# Patient Record
Sex: FEMALE | Race: WHITE | NOT HISPANIC OR LATINO | URBAN - NONMETROPOLITAN AREA
[De-identification: names, ages, dates, MRNs, and addresses within clinical notes are randomized per-mention and may not be internally consistent; named-entity substitution may affect disease eponyms.]

---

## 2020-06-12 ENCOUNTER — OFFICE VISIT (OUTPATIENT)
Dept: URBAN - NONMETROPOLITAN AREA CLINIC 2 | Facility: CLINIC | Age: 80
End: 2020-06-12

## 2020-06-17 ENCOUNTER — OFFICE VISIT (OUTPATIENT)
Dept: URBAN - NONMETROPOLITAN AREA CLINIC 1 | Facility: CLINIC | Age: 80
End: 2020-06-17
Payer: MEDICARE

## 2020-06-17 DIAGNOSIS — K74.69 OTHER CIRRHOSIS OF LIVER: ICD-10-CM

## 2020-06-17 DIAGNOSIS — R18.8 ASCITES: ICD-10-CM

## 2020-06-17 DIAGNOSIS — N28.1 CYST, KIDNEY, ACQUIRED: ICD-10-CM

## 2020-06-17 PROCEDURE — 76705 ECHO EXAM OF ABDOMEN: CPT | Performed by: INTERNAL MEDICINE

## 2020-08-20 ENCOUNTER — ERX REFILL RESPONSE (OUTPATIENT)
Age: 80
End: 2020-08-20

## 2020-08-20 RX ORDER — CARVEDILOL 3.12 MG/1
TAKE 1 TABLET BY MOUTH 2 TIME A DAY WITH FOOD TABLET, FILM COATED ORAL
Qty: 60 | Refills: 4

## 2020-08-21 ENCOUNTER — OFFICE VISIT (OUTPATIENT)
Dept: URBAN - NONMETROPOLITAN AREA CLINIC 2 | Facility: CLINIC | Age: 80
End: 2020-08-21
Payer: MEDICARE

## 2020-08-21 ENCOUNTER — LAB OUTSIDE AN ENCOUNTER (OUTPATIENT)
Dept: URBAN - NONMETROPOLITAN AREA CLINIC 2 | Facility: CLINIC | Age: 80
End: 2020-08-21

## 2020-08-21 DIAGNOSIS — K74.60 CIRRHOSIS: ICD-10-CM

## 2020-08-21 DIAGNOSIS — K72.90 HEPATIC ENCEPHALOPATHY: ICD-10-CM

## 2020-08-21 DIAGNOSIS — R18.8 ASCITES: ICD-10-CM

## 2020-08-21 DIAGNOSIS — K76.6 PORTAL HYPERTENSION: ICD-10-CM

## 2020-08-21 PROCEDURE — G8420 CALC BMI NORM PARAMETERS: HCPCS | Performed by: INTERNAL MEDICINE

## 2020-08-21 PROCEDURE — 99214 OFFICE O/P EST MOD 30 MIN: CPT | Performed by: INTERNAL MEDICINE

## 2020-08-21 PROCEDURE — G8427 DOCREV CUR MEDS BY ELIG CLIN: HCPCS | Performed by: INTERNAL MEDICINE

## 2020-08-21 PROCEDURE — G9903 PT SCRN TBCO ID AS NON USER: HCPCS | Performed by: INTERNAL MEDICINE

## 2020-08-21 RX ORDER — FOLIC ACID 1 MG/1
TAKE 1 TABLET (1 MG) BY ORAL ROUTE ONCE DAILY TABLET ORAL 1
Qty: 0 | Refills: 0 | Status: ACTIVE | COMMUNITY
Start: 1900-01-01

## 2020-08-21 RX ORDER — LEVOTHYROXINE SODIUM 0.1 MG/1
TAKE 1 TABLET (100 MCG) BY ORAL ROUTE ONCE DAILY TABLET ORAL 1
Qty: 0 | Refills: 0 | Status: ACTIVE | COMMUNITY
Start: 1900-01-01

## 2020-08-21 RX ORDER — FUROSEMIDE 40 MG/1
TAKE 1 AND A HALF  TABLETS  (60 MG) BY ORAL ROUTE ONCE DAILY FOR 30 DAYS TABLET ORAL 1
Qty: 90 | Refills: 5 | Status: ACTIVE | COMMUNITY
Start: 2020-02-28 | End: 2021-02-22

## 2020-08-21 RX ORDER — SPIRONOLACTONE 50 MG/1
TAKE 3 TABLETS BY ORAL ROUTE DAILY FOR 60 DAYS TABLET, FILM COATED ORAL 1
Qty: 180 | Refills: 3 | Status: ACTIVE | COMMUNITY
Start: 2020-02-28 | End: 2020-10-25

## 2020-08-21 RX ORDER — POTASSIUM CHLORIDE 1.5 G/1
POWDER, FOR SOLUTION ORAL
Qty: 0 | Refills: 0 | Status: ACTIVE | COMMUNITY
Start: 1900-01-01

## 2020-08-21 RX ORDER — CARVEDILOL 3.12 MG/1
TAKE 1 TABLET BY MOUTH 2 TIME A DAY WITH FOOD TABLET, FILM COATED ORAL
Qty: 60 | Refills: 4 | Status: ACTIVE | COMMUNITY

## 2020-08-21 RX ORDER — LACTULOSE 10 G/15ML
TAKE 30 MILLILITERS (20 GRAM) BY ORAL ROUTE ONCE DAILY FOR 30 DAYS SOLUTION ORAL 1
Qty: 900 | Refills: 5 | Status: ACTIVE | COMMUNITY
Start: 2020-02-28 | End: 2020-08-26

## 2020-08-21 RX ORDER — RIFAXIMIN 550 MG/1
TAKE 1 TABLET (550 MG) BY ORAL ROUTE 2 TIMES PER DAY FOR 30 DAYS TABLET ORAL 2
Qty: 60 | Refills: 5 | Status: ACTIVE | COMMUNITY
Start: 2020-03-18 | End: 2020-09-14

## 2020-08-21 RX ORDER — RIFAXIMIN 550 MG/1
1 TABLET TABLET ORAL TWICE A DAY
Qty: 180 TABLET | Refills: 3

## 2020-08-21 RX ORDER — RISPERIDONE 0.5 MG/1
1 TABLET BID TABLET ORAL
Qty: 0 | Refills: 0 | Status: ACTIVE | COMMUNITY
Start: 1900-01-01

## 2020-08-21 RX ORDER — ESCITALOPRAM 10 MG/1
TAKE 1 TABLET (10 MG) BY ORAL ROUTE ONCE DAILY TABLET, FILM COATED ORAL 1
Qty: 0 | Refills: 0 | Status: ACTIVE | COMMUNITY
Start: 1900-01-01

## 2020-08-21 NOTE — HPI-TODAY'S VISIT:
Ms Dent returns for a f/u visit. She has BABCOCK cirrhosis complicated by portal hypertension, ascites, HE and esophageal varices. She is on Lasix 60/Aldactone 150 and her ascites is well controlled. She has not required a LVP in almost a year. HE well controlled with Lactulose and Xifaxan. EGD in Sept 2019 showed EV. She is on Coreg for prophylaxis.  Labs from June show good synthetic function. US from june did not show any suspicious liver lesions and AFP was 1.1. No complaints today.

## 2020-11-15 ENCOUNTER — ERX REFILL RESPONSE (OUTPATIENT)
Age: 80
End: 2020-11-15

## 2020-11-15 ENCOUNTER — TELEPHONE ENCOUNTER (OUTPATIENT)
Dept: URBAN - METROPOLITAN AREA CLINIC 92 | Facility: CLINIC | Age: 80
End: 2020-11-15

## 2020-11-15 RX ORDER — SPIRONOLACTONE 50 MG/1
TAKE 3 TABLETS BY MOUTH ONCE A DAY TABLET, FILM COATED ORAL
Qty: 180 | Refills: 2

## 2020-11-18 LAB
A/G RATIO: 1.7
AFP, SERUM, TUMOR MARKER: 1.1
ALBUMIN: 4.4
ALKALINE PHOSPHATASE: 166
ALT (SGPT): 26
AST (SGOT): 25
BASO (ABSOLUTE): 0
BASOS: 0
BILIRUBIN, TOTAL: 0.6
BUN/CREATININE RATIO: 20
BUN: 24
CALCIUM: 10.3
CARBON DIOXIDE, TOTAL: 22
CHLORIDE: 99
CREATININE: 1.2
EGFR IF AFRICN AM: 49
EGFR IF NONAFRICN AM: 42
EOS (ABSOLUTE): 0.2
EOS: 2
GLOBULIN, TOTAL: 2.6
GLUCOSE: 121
HEMATOCRIT: 41.6
HEMATOLOGY COMMENTS:: (no result)
HEMOGLOBIN: 14.6
IMMATURE CELLS: (no result)
IMMATURE GRANS (ABS): 0
IMMATURE GRANULOCYTES: 0
INR: 1
LYMPHS (ABSOLUTE): 1.5
LYMPHS: 18
MCH: 32.6
MCHC: 35.1
MCV: 93
MONOCYTES(ABSOLUTE): 0.9
MONOCYTES: 11
NEUTROPHILS (ABSOLUTE): 5.7
NEUTROPHILS: 69
NRBC: (no result)
PLATELETS: 291
POTASSIUM: 5.3
PROTEIN, TOTAL: 7
PROTHROMBIN TIME: 11
RBC: 4.48
RDW: 12.4
SODIUM: 136
WBC: 8.3

## 2020-11-21 ENCOUNTER — LAB OUTSIDE AN ENCOUNTER (OUTPATIENT)
Dept: URBAN - NONMETROPOLITAN AREA CLINIC 2 | Facility: CLINIC | Age: 80
End: 2020-11-21

## 2020-12-02 ENCOUNTER — LAB OUTSIDE AN ENCOUNTER (OUTPATIENT)
Dept: URBAN - METROPOLITAN AREA TELEHEALTH 2 | Facility: TELEHEALTH | Age: 80
End: 2020-12-02

## 2020-12-02 ENCOUNTER — TELEPHONE ENCOUNTER (OUTPATIENT)
Dept: URBAN - METROPOLITAN AREA CLINIC 92 | Facility: CLINIC | Age: 80
End: 2020-12-02

## 2020-12-02 ENCOUNTER — OFFICE VISIT (OUTPATIENT)
Dept: URBAN - METROPOLITAN AREA TELEHEALTH 2 | Facility: TELEHEALTH | Age: 80
End: 2020-12-02
Payer: MEDICARE

## 2020-12-02 DIAGNOSIS — K74.60 CIRRHOSIS: ICD-10-CM

## 2020-12-02 DIAGNOSIS — R18.8 ASCITES: ICD-10-CM

## 2020-12-02 DIAGNOSIS — K72.90 HEPATIC ENCEPHALOPATHY: ICD-10-CM

## 2020-12-02 DIAGNOSIS — I85.00 ESOPHAGEAL VARICES: ICD-10-CM

## 2020-12-02 PROCEDURE — 99213 OFFICE O/P EST LOW 20 MIN: CPT | Performed by: INTERNAL MEDICINE

## 2020-12-02 PROCEDURE — 99442 PHONE E/M BY PHYS 11-20 MIN: CPT | Performed by: INTERNAL MEDICINE

## 2020-12-02 RX ORDER — SPIRONOLACTONE 50 MG/1
TAKE 3 TABLETS BY MOUTH ONCE A DAY TABLET, FILM COATED ORAL
Qty: 180 | Refills: 2 | Status: ACTIVE | COMMUNITY

## 2020-12-02 RX ORDER — FOLIC ACID 1 MG/1
TAKE 1 TABLET (1 MG) BY ORAL ROUTE ONCE DAILY TABLET ORAL 1
Qty: 0 | Refills: 0 | Status: ACTIVE | COMMUNITY
Start: 1900-01-01

## 2020-12-02 RX ORDER — ESCITALOPRAM 10 MG/1
TAKE 1 TABLET (10 MG) BY ORAL ROUTE ONCE DAILY TABLET, FILM COATED ORAL 1
Qty: 0 | Refills: 0 | Status: ACTIVE | COMMUNITY
Start: 1900-01-01

## 2020-12-02 RX ORDER — FUROSEMIDE 40 MG/1
TAKE 1 AND A HALF  TABLETS  (60 MG) BY ORAL ROUTE ONCE DAILY FOR 30 DAYS TABLET ORAL 1
Qty: 90 | Refills: 5 | Status: ACTIVE | COMMUNITY
Start: 2020-02-28 | End: 2021-02-22

## 2020-12-02 RX ORDER — POTASSIUM CHLORIDE 1.5 G/1
POWDER, FOR SOLUTION ORAL
Qty: 0 | Refills: 0 | Status: ACTIVE | COMMUNITY
Start: 1900-01-01

## 2020-12-02 RX ORDER — CARVEDILOL 3.12 MG/1
TAKE 1 TABLET BY MOUTH 2 TIME A DAY WITH FOOD TABLET, FILM COATED ORAL
Qty: 60 | Refills: 4 | Status: ACTIVE | COMMUNITY

## 2020-12-02 RX ORDER — RIFAXIMIN 550 MG/1
1 TABLET TABLET ORAL TWICE A DAY
Qty: 180 TABLET | Refills: 3 | Status: ACTIVE | COMMUNITY

## 2020-12-02 RX ORDER — LEVOTHYROXINE SODIUM 0.1 MG/1
TAKE 1 TABLET (100 MCG) BY ORAL ROUTE ONCE DAILY TABLET ORAL 1
Qty: 0 | Refills: 0 | Status: ACTIVE | COMMUNITY
Start: 1900-01-01

## 2020-12-02 RX ORDER — RISPERIDONE 0.5 MG/1
1 TABLET BID TABLET ORAL
Qty: 0 | Refills: 0 | Status: ACTIVE | COMMUNITY
Start: 1900-01-01

## 2020-12-02 NOTE — HPI-TODAY'S VISIT:
Ms Dent returns for a f/u visit by telephone. She has cirrhosis from [place] complicated by portal hypertension, ascites and HE. Currently on Lasix 60/Aldactone 150 mg daily and her ascites is well controlled. On Lactulose and Xifaxan and her HE is well controlled.  She is due for an US but this has not been scheduled yet. Recent labs show good synthetic function. AFP is 1.1. Potassium is slightly elevated. She apparently is taking a potassium supplement. EGD last year showed varices and she is on Coreg. No complaints today.

## 2020-12-20 ENCOUNTER — ERX REFILL RESPONSE (OUTPATIENT)
Age: 80
End: 2020-12-20

## 2020-12-20 RX ORDER — LACTULOSE 10 G/15ML
TAKE 30ML BY MOUTH ONCE A DAY SOLUTION ORAL
Qty: 900 | Refills: 4

## 2020-12-31 ENCOUNTER — ERX REFILL RESPONSE (OUTPATIENT)
Age: 80
End: 2020-12-31

## 2020-12-31 RX ORDER — CARVEDILOL 3.12 MG/1
TAKE 1 TABLET BY MOUTH 2 TIME A DAY WITH FOOD TABLET, FILM COATED ORAL
Qty: 60 | Refills: 3

## 2021-02-11 ENCOUNTER — TELEPHONE ENCOUNTER (OUTPATIENT)
Dept: URBAN - NONMETROPOLITAN AREA CLINIC 2 | Facility: CLINIC | Age: 81
End: 2021-02-11

## 2021-02-12 LAB
A/G RATIO: 1.4
ALBUMIN: 4.3
ALKALINE PHOSPHATASE: 175
ALT (SGPT): 26
AST (SGOT): 24
BASO (ABSOLUTE): 0.1
BASOS: 1
BILIRUBIN, TOTAL: 0.8
BUN/CREATININE RATIO: 21
BUN: 24
CALCIUM: 10.4
CARBON DIOXIDE, TOTAL: 23
CHLORIDE: 96
CREATININE: 1.16
EGFR IF AFRICN AM: 51
EGFR IF NONAFRICN AM: 44
EOS (ABSOLUTE): 0.1
EOS: 2
GLOBULIN, TOTAL: 3.1
GLUCOSE: 121
HEMATOCRIT: 44.7
HEMATOLOGY COMMENTS:: (no result)
HEMOGLOBIN: 14.8
HEP A AB, IGM: NEGATIVE
HEP A AB, TOTAL: POSITIVE
HEP B CORE AB, TOT: NEGATIVE
IMMATURE CELLS: (no result)
IMMATURE GRANS (ABS): 0
IMMATURE GRANULOCYTES: 0
INR: 1.1
LYMPHS (ABSOLUTE): 1.7
LYMPHS: 18
MCH: 30.5
MCHC: 33.1
MCV: 92
MONOCYTES(ABSOLUTE): 1
MONOCYTES: 11
NEUTROPHILS (ABSOLUTE): 6.5
NEUTROPHILS: 68
NRBC: (no result)
PLATELETS: 297
POTASSIUM: 4.8
PROTEIN, TOTAL: 7.4
PROTHROMBIN TIME: 11.5
RBC: 4.85
RDW: 12.3
SODIUM: 137
WBC: 9.4

## 2021-02-22 ENCOUNTER — LAB OUTSIDE AN ENCOUNTER (OUTPATIENT)
Dept: URBAN - NONMETROPOLITAN AREA CLINIC 2 | Facility: CLINIC | Age: 81
End: 2021-02-22

## 2021-02-22 ENCOUNTER — OFFICE VISIT (OUTPATIENT)
Dept: URBAN - NONMETROPOLITAN AREA CLINIC 2 | Facility: CLINIC | Age: 81
End: 2021-02-22
Payer: MEDICARE

## 2021-02-22 VITALS
DIASTOLIC BLOOD PRESSURE: 74 MMHG | HEART RATE: 56 BPM | BODY MASS INDEX: 26.58 KG/M2 | HEIGHT: 63 IN | WEIGHT: 150 LBS | SYSTOLIC BLOOD PRESSURE: 157 MMHG | TEMPERATURE: 97 F

## 2021-02-22 DIAGNOSIS — K72.90 HEPATIC ENCEPHALOPATHY: ICD-10-CM

## 2021-02-22 DIAGNOSIS — I85.00 ESOPHAGEAL VARICES: ICD-10-CM

## 2021-02-22 DIAGNOSIS — K76.6 PORTAL HYPERTENSION: ICD-10-CM

## 2021-02-22 DIAGNOSIS — R18.8 ASCITES: ICD-10-CM

## 2021-02-22 DIAGNOSIS — K74.60 CIRRHOSIS: ICD-10-CM

## 2021-02-22 PROCEDURE — 99214 OFFICE O/P EST MOD 30 MIN: CPT | Performed by: INTERNAL MEDICINE

## 2021-02-22 RX ORDER — RIFAXIMIN 550 MG/1
1 TABLET TABLET ORAL TWICE A DAY
Qty: 180 TABLET | Refills: 3 | Status: ACTIVE | COMMUNITY

## 2021-02-22 RX ORDER — SPIRONOLACTONE 50 MG/1
TAKE 3 TABLETS BY MOUTH ONCE A DAY TABLET, FILM COATED ORAL
Qty: 180 | Refills: 2 | Status: ACTIVE | COMMUNITY

## 2021-02-22 RX ORDER — CARVEDILOL 3.12 MG/1
TAKE 1 TABLET BY MOUTH 2 TIME A DAY WITH FOOD TABLET, FILM COATED ORAL
Qty: 60 | Refills: 3 | Status: ACTIVE | COMMUNITY

## 2021-02-22 RX ORDER — RISPERIDONE 0.5 MG/1
1 TABLET BID TABLET ORAL
Qty: 0 | Refills: 0 | Status: ACTIVE | COMMUNITY
Start: 1900-01-01

## 2021-02-22 RX ORDER — FOLIC ACID 1 MG/1
TAKE 1 TABLET (1 MG) BY ORAL ROUTE ONCE DAILY TABLET ORAL 1
Qty: 0 | Refills: 0 | Status: ACTIVE | COMMUNITY
Start: 1900-01-01

## 2021-02-22 RX ORDER — RIFAXIMIN 550 MG/1
1 TABLET TABLET ORAL TWICE A DAY
Qty: 60 | Refills: 2 | OUTPATIENT

## 2021-02-22 RX ORDER — ESCITALOPRAM 10 MG/1
TAKE 1 TABLET (10 MG) BY ORAL ROUTE ONCE DAILY TABLET, FILM COATED ORAL 1
Qty: 0 | Refills: 0 | Status: ACTIVE | COMMUNITY
Start: 1900-01-01

## 2021-02-22 RX ORDER — LACTULOSE 10 G/15ML
TAKE 30ML BY MOUTH ONCE A DAY SOLUTION ORAL
Qty: 900 | Refills: 4 | Status: ACTIVE | COMMUNITY

## 2021-02-22 RX ORDER — FUROSEMIDE 40 MG/1
TAKE 1 AND A HALF  TABLETS  (60 MG) BY ORAL ROUTE ONCE DAILY FOR 30 DAYS TABLET ORAL 1
Qty: 90 | Refills: 5 | Status: ACTIVE | COMMUNITY
Start: 2020-02-28 | End: 2021-02-22

## 2021-02-22 RX ORDER — LEVOTHYROXINE SODIUM 0.1 MG/1
TAKE 1 TABLET (100 MCG) BY ORAL ROUTE ONCE DAILY TABLET ORAL 1
Qty: 0 | Refills: 0 | Status: ACTIVE | COMMUNITY
Start: 1900-01-01

## 2021-02-22 RX ORDER — POTASSIUM CHLORIDE 1.5 G/1
POWDER, FOR SOLUTION ORAL
Qty: 0 | Refills: 0 | Status: ACTIVE | COMMUNITY
Start: 1900-01-01

## 2021-02-22 NOTE — PHYSICAL EXAM NECK/THYROID:
normal appearance , trachea midline , no elevated JVD Photo Preface (Leave Blank If You Do Not Want): Photographs were obtained today Detail Level: Zone

## 2021-02-22 NOTE — HPI-TODAY'S VISIT:
80 year old female with past medical history of hypertension, hyperlipidemia, CAD, heart failure with preserved ejection fraction, rheumatoid arthritis, BABC[place] c/b portal hypertension, ascites, HE.   12/2/2020: Follow-up with Dr. Pulido   Ms Dent returns for a f/u visit by telephone. She has cirrhosis from [place] complicated by portal hypertension, ascites and HE. Currently on Lasix 60/Aldactone 150 mg daily and her ascites is well controlled. On Lactulose and Xifaxan and her HE is well controlled.  She is due for an US but this has not been scheduled yet. Recent labs show good synthetic function. AFP is 1.1. Potassium is slightly elevated. She apparently is taking a potassium supplement. EGD last year showed varices and she is on Coreg. No complaints today.  2/22/2021: Follow-Up Gastroenterology Appointment Ms. Dent denies abdominal pain, lower extremity edema, confusion. She denies melena, hematochezia, nausea, vomiting. She is taking lactulose and rifaximin as instrcuted. She is taking furosemide 60 mg PO daily and spironolactone 100 mg PO daily. She is tkaing cavedilol 3.125 mg PO BID. She is taking lactulose daily. She had labs obtained prior to today's visit which are pending.   Prior Cirrhosis Workup:  5/20/2019: Hepatitis A IgM negative, alpha-1-antitrypsin normal, iron studies without evidnece of iron overload, hepatitis B core IgM negative, hepatitis B surface antigen negative, hepatitis B surface antibody negative, hepatitis C virus antibody negative. Alpha-1-antitrpyin phenotype: MS. 11/21/2020: WBC 8.3, hemoglobin 14.6, hematocrit 41.6, platelets 291. Chemistry panel notable for creatinine 1.20, potassium 5.3. AST normal at 25, ALT normal at 26, alkaline phosphatase elevated at 166, total bilirubin 0.6. AFP 1.1. INR 1.0.  12/2/2020: Hepatitis A IgM negative, hepatitis A total antibody positive. Hepatitis B core antibody negative. INR normal. CBC normal. Chemistry panel notable for elevated calcium 10.4, elevated creatinine 1.16. AST 25, ALT 26, alkaline phosphatase elevated at 175, total bilirubin 0.8.  9/13/2019: EGD Grade II varices were found in the distal esophagus. Moderate portal hypertensive gastropathy was found in the entire examine dstomach. Biopsied The examined duodenum was normal. Biopsied. The cardia and gastric fundus were normal on retroflexion. The exam was otherwise without abnormality. 9/13/2019: Pathology from EGD A.	Second Part of Duodenum: Normal. B.	Gastric, Biopsy: Gastric antral type mucosa with reactive gastropathy.  9/13/2019: Colonoscopy  The perianal and digital rectal examinations were normal. A few small-mouthed diverticula were found in the sigmoid and descending colon. The exam was otherwise without abnormality.

## 2021-02-27 ENCOUNTER — TELEPHONE ENCOUNTER (OUTPATIENT)
Dept: URBAN - NONMETROPOLITAN AREA CLINIC 2 | Facility: CLINIC | Age: 81
End: 2021-02-27

## 2021-03-02 ENCOUNTER — OFFICE VISIT (OUTPATIENT)
Dept: URBAN - METROPOLITAN AREA MEDICAL CENTER 1 | Facility: MEDICAL CENTER | Age: 81
End: 2021-03-02
Payer: MEDICARE

## 2021-03-02 DIAGNOSIS — K76.6 CLINICALLY SIGNIFICANT PORTAL HYPERTENSION: ICD-10-CM

## 2021-03-02 DIAGNOSIS — I85.10 ESOPH VARICE OTHER DIS: ICD-10-CM

## 2021-03-02 DIAGNOSIS — K75.81 NASH WITH CIRRHOSIS: ICD-10-CM

## 2021-03-02 PROCEDURE — 43235 EGD DIAGNOSTIC BRUSH WASH: CPT | Performed by: INTERNAL MEDICINE

## 2021-03-02 RX ORDER — RISPERIDONE 0.5 MG/1
1 TABLET BID TABLET ORAL
Qty: 0 | Refills: 0 | Status: ACTIVE | COMMUNITY
Start: 1900-01-01

## 2021-03-02 RX ORDER — SPIRONOLACTONE 50 MG/1
TAKE 3 TABLETS BY MOUTH ONCE A DAY TABLET, FILM COATED ORAL
Qty: 180 | Refills: 2 | Status: ACTIVE | COMMUNITY

## 2021-03-02 RX ORDER — LEVOTHYROXINE SODIUM 0.1 MG/1
TAKE 1 TABLET (100 MCG) BY ORAL ROUTE ONCE DAILY TABLET ORAL 1
Qty: 0 | Refills: 0 | Status: ACTIVE | COMMUNITY
Start: 1900-01-01

## 2021-03-02 RX ORDER — ESCITALOPRAM 10 MG/1
TAKE 1 TABLET (10 MG) BY ORAL ROUTE ONCE DAILY TABLET, FILM COATED ORAL 1
Qty: 0 | Refills: 0 | Status: ACTIVE | COMMUNITY
Start: 1900-01-01

## 2021-03-02 RX ORDER — RIFAXIMIN 550 MG/1
1 TABLET TABLET ORAL TWICE A DAY
Qty: 180 TABLET | Refills: 3 | Status: ACTIVE | COMMUNITY

## 2021-03-02 RX ORDER — LACTULOSE 10 G/15ML
TAKE 30ML BY MOUTH ONCE A DAY SOLUTION ORAL
Qty: 900 | Refills: 4 | Status: ACTIVE | COMMUNITY

## 2021-03-02 RX ORDER — FOLIC ACID 1 MG/1
TAKE 1 TABLET (1 MG) BY ORAL ROUTE ONCE DAILY TABLET ORAL 1
Qty: 0 | Refills: 0 | Status: ACTIVE | COMMUNITY
Start: 1900-01-01

## 2021-03-02 RX ORDER — CARVEDILOL 3.12 MG/1
TAKE 1 TABLET BY MOUTH 2 TIME A DAY WITH FOOD TABLET, FILM COATED ORAL
Qty: 60 | Refills: 3 | Status: ACTIVE | COMMUNITY

## 2021-03-02 RX ORDER — RIFAXIMIN 550 MG/1
1 TABLET TABLET ORAL TWICE A DAY
Qty: 60 | Refills: 2 | Status: ACTIVE | COMMUNITY

## 2021-03-02 RX ORDER — POTASSIUM CHLORIDE 1.5 G/1
POWDER, FOR SOLUTION ORAL
Qty: 0 | Refills: 0 | Status: ACTIVE | COMMUNITY
Start: 1900-01-01

## 2021-03-16 ENCOUNTER — OFFICE VISIT (OUTPATIENT)
Dept: URBAN - NONMETROPOLITAN AREA CLINIC 2 | Facility: CLINIC | Age: 81
End: 2021-03-16

## 2021-03-23 ENCOUNTER — OFFICE VISIT (OUTPATIENT)
Dept: URBAN - NONMETROPOLITAN AREA CLINIC 2 | Facility: CLINIC | Age: 81
End: 2021-03-23
Payer: MEDICARE

## 2021-03-23 VITALS
SYSTOLIC BLOOD PRESSURE: 131 MMHG | HEIGHT: 63 IN | DIASTOLIC BLOOD PRESSURE: 78 MMHG | TEMPERATURE: 96.8 F | HEART RATE: 57 BPM

## 2021-03-23 DIAGNOSIS — I85.00 ESOPHAGEAL VARICES: ICD-10-CM

## 2021-03-23 DIAGNOSIS — K74.60 CIRRHOSIS: ICD-10-CM

## 2021-03-23 DIAGNOSIS — R18.8 ASCITES: ICD-10-CM

## 2021-03-23 DIAGNOSIS — K76.6 PORTAL HYPERTENSION: ICD-10-CM

## 2021-03-23 DIAGNOSIS — K72.90 HEPATIC ENCEPHALOPATHY: ICD-10-CM

## 2021-03-23 PROCEDURE — 99214 OFFICE O/P EST MOD 30 MIN: CPT | Performed by: INTERNAL MEDICINE

## 2021-03-23 RX ORDER — CARVEDILOL 3.12 MG/1
TAKE 1 TABLET BY MOUTH 2 TIME A DAY WITH FOOD TABLET, FILM COATED ORAL
Qty: 60 | Refills: 3 | Status: ACTIVE | COMMUNITY

## 2021-03-23 RX ORDER — LACTULOSE 10 G/15ML
TAKE 30ML BY MOUTH ONCE A DAY SOLUTION ORAL
Qty: 900 | Refills: 4 | Status: ACTIVE | COMMUNITY

## 2021-03-23 RX ORDER — ESCITALOPRAM 10 MG/1
TAKE 1 TABLET (10 MG) BY ORAL ROUTE ONCE DAILY TABLET, FILM COATED ORAL 1
Qty: 0 | Refills: 0 | Status: ACTIVE | COMMUNITY
Start: 1900-01-01

## 2021-03-23 RX ORDER — LEVOTHYROXINE SODIUM 0.1 MG/1
TAKE 1 TABLET (100 MCG) BY ORAL ROUTE ONCE DAILY TABLET ORAL 1
Qty: 0 | Refills: 0 | Status: ACTIVE | COMMUNITY
Start: 1900-01-01

## 2021-03-23 RX ORDER — RISPERIDONE 0.5 MG/1
1 TABLET BID TABLET ORAL
Qty: 0 | Refills: 0 | Status: ACTIVE | COMMUNITY
Start: 1900-01-01

## 2021-03-23 RX ORDER — POTASSIUM CHLORIDE 1.5 G/1
POWDER, FOR SOLUTION ORAL
Qty: 0 | Refills: 0 | Status: ACTIVE | COMMUNITY
Start: 1900-01-01

## 2021-03-23 RX ORDER — FOLIC ACID 1 MG/1
TAKE 1 TABLET (1 MG) BY ORAL ROUTE ONCE DAILY TABLET ORAL 1
Qty: 0 | Refills: 0 | Status: ACTIVE | COMMUNITY
Start: 1900-01-01

## 2021-03-23 RX ORDER — SPIRONOLACTONE 50 MG/1
TAKE 3 TABLETS BY MOUTH ONCE A DAY TABLET, FILM COATED ORAL
Qty: 180 | Refills: 2 | Status: ACTIVE | COMMUNITY

## 2021-03-23 RX ORDER — RIFAXIMIN 550 MG/1
1 TABLET TABLET ORAL TWICE A DAY
Qty: 60 | Refills: 2 | OUTPATIENT

## 2021-03-23 RX ORDER — RIFAXIMIN 550 MG/1
1 TABLET TABLET ORAL TWICE A DAY
Qty: 60 | Refills: 2 | Status: ACTIVE | COMMUNITY

## 2021-03-23 RX ORDER — RIFAXIMIN 550 MG/1
1 TABLET TABLET ORAL TWICE A DAY
Qty: 180 TABLET | Refills: 3 | Status: ACTIVE | COMMUNITY

## 2021-03-23 NOTE — PHYSICAL EXAM GASTROINTESTINAL
Abdomen , abdominal hernia appreciated, soft, nontender, nondistended , no guarding or rigidity , no masses palpable , normal bowel sounds , Liver and Spleen , no hepatomegaly present , no hepatosplenomegaly , liver nontender , spleen not palpable

## 2021-03-23 NOTE — HPI-TODAY'S VISIT:
80 year old female with past medical history of hypertension, hyperlipidemia, CAD, heart failure with preserved ejection fraction, rheumatoid arthritis, B[place] c/b portal hypertension, ascites, HE.     12/2/2020: Follow-up with Dr. Pulido   Ms Dent returns for a f/u visit by telephone. She has cirrhosis from [place] complicated by portal hypertension, ascites and HE. Currently on Lasix 60/Aldactone 150 mg daily and her ascites is well controlled. On Lactulose and Xifaxan and her HE is well controlled.  She is due for an US but this has not been scheduled yet. Recent labs show good synthetic function. AFP is 1.1. Potassium is slightly elevated. She apparently is taking a potassium supplement. EGD last year showed varices and she is on Coreg. No complaints today.  2/22/2021: Follow-Up Gastroenterology Appointment Ms. Dent denies abdominal pain, lower extremity edema, confusion. She denies melena, hematochezia, nausea, vomiting. She is taking lactulose and rifaximin as instructed. She is taking furosemide 60 mg PO daily and spironolactone 100 mg PO daily. She is tkaing cavedilol 3.125 mg PO BID. She is taking lactulose daily. She had labs obtained prior to today's visit which are pending.   Prior Cirrhosis Workup:  5/20/2019: Hepatitis A IgM negative, alpha-1-antitrypsin normal, iron studies without evidnece of iron overload, hepatitis B core IgM negative, hepatitis B surface antigen negative, hepatitis B surface antibody negative, hepatitis C virus antibody negative. Alpha-1-antitrpyin phenotype: MS. 11/21/2020: WBC 8.3, hemoglobin 14.6, hematocrit 41.6, platelets 291. Chemistry panel notable for creatinine 1.20, potassium 5.3. AST normal at 25, ALT normal at 26, alkaline phosphatase elevated at 166, total bilirubin 0.6. AFP 1.1. INR 1.0.  12/2/2020: Hepatitis A IgM negative, hepatitis A total antibody positive. Hepatitis B core antibody negative. INR normal. CBC normal. Chemistry panel notable for elevated calcium 10.4, elevated creatinine 1.16. AST 25, ALT 26, alkaline phosphatase elevated at 175, total bilirubin 0.8.  9/13/2019: EGD Grade II varices were found in the distal esophagus. Moderate portal hypertensive gastropathy was found in the entire examine dstomach. Biopsied The examined duodenum was normal. Biopsied. The cardia and gastric fundus were normal on retroflexion. The exam was otherwise without abnormality. 9/13/2019: Pathology from EGD A. Second Part of Duodenum: Normal. B. Gastric, Biopsy: Gastric antral type mucosa with reactive gastropathy.  9/13/2019: Colonoscopy  The perianal and digital rectal examinations were normal. A few small-mouthed diverticula were found in the sigmoid and descending colon. The exam was otherwise without abnormality.  3/2/2021: EGD FINDINGS / IMPRESSION: - The esophagus showed decreased motility throughout the esophagus. - There was one column of grade I varices at the distal esophagus. There was no evidence of high risk bleeding stigmata on the esophageal varices. - There was moderate portal hypertensive gastropathy with associated heme throughout the stomach. Extensive lavage of the stomach did not reveal a mucosal lesion such as a gastric ulcer. There was no evidence of fresh red blood or active bleeding in the stomach.  - Retroflexion in the stomach did not reveal evidence of gastric varices.  - The gastroscope was able to advance to the duodenal bulb. The gastroscope was unable to advance to the second portion of the duodenum given looping within the stomach.   3/16/2021: Abdominal Ultrasound IMPRESSION: 1. Scalloping the hepatic contour. This is suspicious for cirrhosis. 2. Cholecystectomy. 3. No right hydronephrosis.  3/23/2021: Gastroenterology Follow-Up Appointment  Ms. Dent denies abdominal pain, abdominal swellingm, nausea, vomiting, melena, hematochezia, heamtemesis. She has 1-2 bowel movements per day. She is on furosemide and spironolactone. She is on lactulose and rifaximin for hepatic encephaopathy. She is on carvedilol for history of varices.

## 2021-03-24 LAB
A/G RATIO: 1.5
AFP, SERUM, TUMOR MARKER: 1.1
ALBUMIN: 4.3
ALKALINE PHOSPHATASE: 164
ALT (SGPT): 31
AST (SGOT): 29
BASO (ABSOLUTE): 0.1
BASOS: 1
BILIRUBIN, TOTAL: 0.6
BUN/CREATININE RATIO: 15
BUN: 17
CALCIUM: 10.4
CARBON DIOXIDE, TOTAL: 23
CHLORIDE: 97
CREATININE: 1.14
EGFR IF AFRICN AM: 52
EGFR IF NONAFRICN AM: 45
EOS (ABSOLUTE): 0.2
EOS: 2
GLOBULIN, TOTAL: 2.8
GLUCOSE: 196
HEMATOCRIT: 44
HEMATOLOGY COMMENTS:: (no result)
HEMOGLOBIN: 14.8
IMMATURE CELLS: (no result)
IMMATURE GRANS (ABS): 0
IMMATURE GRANULOCYTES: 0
INR: 1
LYMPHS (ABSOLUTE): 1.6
LYMPHS: 16
MCH: 31.4
MCHC: 33.6
MCV: 93
MONOCYTES(ABSOLUTE): 0.9
MONOCYTES: 9
NEUTROPHILS (ABSOLUTE): 7
NEUTROPHILS: 72
NRBC: (no result)
PLATELETS: 291
POTASSIUM: 4.8
PROTEIN, TOTAL: 7.1
PROTHROMBIN TIME: 10.9
RBC: 4.71
RDW: 12.3
SODIUM: 136
WBC: 9.7

## 2021-06-06 ENCOUNTER — TELEPHONE ENCOUNTER (OUTPATIENT)
Dept: URBAN - NONMETROPOLITAN AREA CLINIC 2 | Facility: CLINIC | Age: 81
End: 2021-06-06

## 2021-06-06 RX ORDER — POTASSIUM CHLORIDE 1.5 G/1
POWDER, FOR SOLUTION ORAL
Qty: 0 | Refills: 0 | Status: ACTIVE | COMMUNITY
Start: 1900-01-01

## 2021-06-06 RX ORDER — RISPERIDONE 0.5 MG/1
1 TABLET BID TABLET ORAL
Qty: 0 | Refills: 0 | Status: ACTIVE | COMMUNITY
Start: 1900-01-01

## 2021-06-06 RX ORDER — LEVOTHYROXINE SODIUM 0.1 MG/1
TAKE 1 TABLET (100 MCG) BY ORAL ROUTE ONCE DAILY TABLET ORAL 1
Qty: 0 | Refills: 0 | Status: ACTIVE | COMMUNITY
Start: 1900-01-01

## 2021-06-06 RX ORDER — SPIRONOLACTONE 50 MG/1
TAKE 3 TABLETS BY MOUTH ONCE A DAY TABLET, FILM COATED ORAL
Qty: 180 | Refills: 2 | Status: ACTIVE | COMMUNITY

## 2021-06-06 RX ORDER — RIFAXIMIN 550 MG/1
1 TABLET TABLET ORAL TWICE A DAY
Qty: 60 | Refills: 2 | Status: ACTIVE | COMMUNITY

## 2021-06-06 RX ORDER — CARVEDILOL 3.12 MG/1
TAKE 1 TABLET BY MOUTH 2 TIME A DAY WITH FOOD TABLET, FILM COATED ORAL
Qty: 60 | Refills: 3 | Status: ACTIVE | COMMUNITY

## 2021-06-06 RX ORDER — CARVEDILOL 3.12 MG/1
1 TABLET WITH FOOD TABLET, FILM COATED ORAL TWICE A DAY
Qty: 180 TABLET | Refills: 3 | OUTPATIENT
Start: 2021-06-18

## 2021-06-06 RX ORDER — ESCITALOPRAM 10 MG/1
TAKE 1 TABLET (10 MG) BY ORAL ROUTE ONCE DAILY TABLET, FILM COATED ORAL 1
Qty: 0 | Refills: 0 | Status: ACTIVE | COMMUNITY
Start: 1900-01-01

## 2021-06-06 RX ORDER — RIFAXIMIN 550 MG/1
1 TABLET TABLET ORAL TWICE A DAY
Qty: 180 TABLET | Refills: 3 | Status: ACTIVE | COMMUNITY

## 2021-06-06 RX ORDER — LACTULOSE 10 G/15ML
TAKE 30ML BY MOUTH ONCE A DAY SOLUTION ORAL
Qty: 900 | Refills: 4 | Status: ACTIVE | COMMUNITY

## 2021-06-06 RX ORDER — FOLIC ACID 1 MG/1
TAKE 1 TABLET (1 MG) BY ORAL ROUTE ONCE DAILY TABLET ORAL 1
Qty: 0 | Refills: 0 | Status: ACTIVE | COMMUNITY
Start: 1900-01-01

## 2021-06-06 RX ORDER — RIFAXIMIN 550 MG/1
1 TABLET TABLET ORAL TWICE A DAY
Qty: 180 TABLET | Refills: 3 | OUTPATIENT
Start: 2021-06-18 | End: 2022-06-13

## 2021-06-06 RX ORDER — SPIRONOLACTONE 50 MG/1
3 TABLETS TABLET, FILM COATED ORAL ONCE A DAY
Qty: 270 TABLET | Refills: 3 | OUTPATIENT
Start: 2021-06-18 | End: 2022-06-13

## 2021-08-10 ENCOUNTER — TELEPHONE ENCOUNTER (OUTPATIENT)
Dept: URBAN - METROPOLITAN AREA SURGERY CENTER 30 | Facility: SURGERY CENTER | Age: 81
End: 2021-08-10

## 2021-08-10 RX ORDER — LACTULOSE 10 G/15ML
30ML PO QD SOLUTION ORAL ONCE A DAY
Qty: 900 ML | Refills: 6

## 2021-09-24 ENCOUNTER — OFFICE VISIT (OUTPATIENT)
Dept: URBAN - NONMETROPOLITAN AREA CLINIC 2 | Facility: CLINIC | Age: 81
End: 2021-09-24
Payer: MEDICARE

## 2021-09-24 ENCOUNTER — WEB ENCOUNTER (OUTPATIENT)
Dept: URBAN - NONMETROPOLITAN AREA CLINIC 2 | Facility: CLINIC | Age: 81
End: 2021-09-24

## 2021-09-24 ENCOUNTER — LAB OUTSIDE AN ENCOUNTER (OUTPATIENT)
Dept: URBAN - NONMETROPOLITAN AREA CLINIC 2 | Facility: CLINIC | Age: 81
End: 2021-09-24

## 2021-09-24 VITALS
SYSTOLIC BLOOD PRESSURE: 128 MMHG | BODY MASS INDEX: 24.61 KG/M2 | DIASTOLIC BLOOD PRESSURE: 90 MMHG | HEART RATE: 98 BPM | WEIGHT: 138.9 LBS | TEMPERATURE: 97 F | HEIGHT: 63 IN

## 2021-09-24 DIAGNOSIS — K72.90 HEPATIC ENCEPHALOPATHY: ICD-10-CM

## 2021-09-24 DIAGNOSIS — R18.8 ASCITES: ICD-10-CM

## 2021-09-24 DIAGNOSIS — I85.00 ESOPHAGEAL VARICES: ICD-10-CM

## 2021-09-24 DIAGNOSIS — K76.6 PORTAL HYPERTENSION: ICD-10-CM

## 2021-09-24 DIAGNOSIS — K74.60 CIRRHOSIS: ICD-10-CM

## 2021-09-24 PROCEDURE — 99214 OFFICE O/P EST MOD 30 MIN: CPT | Performed by: INTERNAL MEDICINE

## 2021-09-24 RX ORDER — SPIRONOLACTONE 50 MG/1
3 TABLETS TABLET, FILM COATED ORAL ONCE A DAY
Qty: 270 TABLET | Refills: 3 | Status: ACTIVE | COMMUNITY
Start: 2021-06-18 | End: 2022-06-13

## 2021-09-24 RX ORDER — RIFAXIMIN 550 MG/1
1 TABLET TABLET ORAL TWICE A DAY
Qty: 60 | Refills: 2 | Status: ACTIVE | COMMUNITY

## 2021-09-24 RX ORDER — FOLIC ACID 1 MG/1
TAKE 1 TABLET (1 MG) BY ORAL ROUTE ONCE DAILY TABLET ORAL 1
Qty: 0 | Refills: 0 | Status: ACTIVE | COMMUNITY
Start: 1900-01-01

## 2021-09-24 RX ORDER — RIFAXIMIN 550 MG/1
1 TABLET TABLET ORAL TWICE A DAY
Qty: 180 TABLET | Refills: 3 | Status: ACTIVE | COMMUNITY
Start: 2021-06-18 | End: 2022-06-13

## 2021-09-24 RX ORDER — CARVEDILOL 3.12 MG/1
1 TABLET WITH FOOD TABLET, FILM COATED ORAL TWICE A DAY
Qty: 180 TABLET | Refills: 3 | Status: ACTIVE | COMMUNITY
Start: 2021-06-18

## 2021-09-24 RX ORDER — LACTULOSE 10 G/15ML
30ML PO QD SOLUTION ORAL ONCE A DAY
Qty: 900 ML | Refills: 6 | Status: ACTIVE | COMMUNITY

## 2021-09-24 RX ORDER — POTASSIUM CHLORIDE 1.5 G/1
POWDER, FOR SOLUTION ORAL
Qty: 0 | Refills: 0 | Status: ACTIVE | COMMUNITY
Start: 1900-01-01

## 2021-09-24 RX ORDER — RISPERIDONE 0.5 MG/1
1 TABLET BID TABLET ORAL
Qty: 0 | Refills: 0 | Status: ACTIVE | COMMUNITY
Start: 1900-01-01

## 2021-09-24 RX ORDER — ESCITALOPRAM 10 MG/1
TAKE 1 TABLET (10 MG) BY ORAL ROUTE ONCE DAILY TABLET, FILM COATED ORAL 1
Qty: 0 | Refills: 0 | Status: ACTIVE | COMMUNITY
Start: 1900-01-01

## 2021-09-24 RX ORDER — RIFAXIMIN 550 MG/1
1 TABLET TABLET ORAL TWICE A DAY
Qty: 180 TABLET | Refills: 3 | Status: ACTIVE | COMMUNITY

## 2021-09-24 RX ORDER — LEVOTHYROXINE SODIUM 0.1 MG/1
TAKE 1 TABLET (100 MCG) BY ORAL ROUTE ONCE DAILY TABLET ORAL 1
Qty: 0 | Refills: 0 | Status: ACTIVE | COMMUNITY
Start: 1900-01-01

## 2021-09-24 RX ORDER — RIFAXIMIN 550 MG/1
1 TABLET TABLET ORAL TWICE A DAY
Qty: 60 | Refills: 2 | OUTPATIENT

## 2021-09-24 NOTE — HPI-TODAY'S VISIT:
80 year old female with past medical history of hypertension, hyperlipidemia, CAD, heart failure with preserved ejection fraction, rheumatoid arthritis, BABC[place] c/b portal hypertension, ascites, HE.       12/2/2020: Follow-up with Dr. Pulido   Ms Dent returns for a f/u visit by telephone. She has cirrhosis from [place] complicated by portal hypertension, ascites and HE. Currently on Lasix 60/Aldactone 150 mg daily and her ascites is well controlled. On Lactulose and Xifaxan and her HE is well controlled.  She is due for an US but this has not been scheduled yet. Recent labs show good synthetic function. AFP is 1.1. Potassium is slightly elevated. She apparently is taking a potassium supplement. EGD last year showed varices and she is on Coreg. No complaints today.  2/22/2021: Follow-Up Gastroenterology Appointment Ms. Dent denies abdominal pain, lower extremity edema, confusion. She denies melena, hematochezia, nausea, vomiting. She is taking lactulose and rifaximin as instructed. She is taking furosemide 60 mg PO daily and spironolactone 100 mg PO daily. She is taking cavedilol 3.125 mg PO BID. She is taking lactulose daily. She had labs obtained prior to today's visit which are pending.   Prior Cirrhosis Workup:  5/20/2019: Hepatitis A IgM negative, alpha-1-antitrypsin normal, iron studies without evidence of iron overload, hepatitis B core IgM negative, hepatitis B surface antigen negative, hepatitis B surface antibody negative, hepatitis C virus antibody negative. Alpha-1-antitrpyin phenotype: MS. 11/21/2020: WBC 8.3, hemoglobin 14.6, hematocrit 41.6, platelets 291. Chemistry panel notable for creatinine 1.20, potassium 5.3. AST normal at 25, ALT normal at 26, alkaline phosphatase elevated at 166, total bilirubin 0.6. AFP 1.1. INR 1.0.  12/2/2020: Hepatitis A IgM negative, hepatitis A total antibody positive. Hepatitis B core antibody negative. INR normal. CBC normal. Chemistry panel notable for elevated calcium 10.4, elevated creatinine 1.16. AST 25, ALT 26, alkaline phosphatase elevated at 175, total bilirubin 0.8.  9/13/2019: EGD Grade II varices were found in the distal esophagus. Moderate portal hypertensive gastropathy was found in the entire examined stomach. Biopsied The examined duodenum was normal. Biopsied. The cardia and gastric fundus were normal on retroflexion. The exam was otherwise without abnormality. 9/13/2019: Pathology from EGD A. Second Part of Duodenum: Normal. B. Gastric, Biopsy: Gastric antral type mucosa with reactive gastropathy.  9/13/2019: Colonoscopy  The perianal and digital rectal examinations were normal. A few small-mouthed diverticula were found in the sigmoid and descending colon. The exam was otherwise without abnormality.  3/2/2021: EGD FINDINGS / IMPRESSION: - The esophagus showed decreased motility throughout the esophagus. - There was one column of grade I varices at the distal esophagus. There was no evidence of high risk bleeding stigmata on the esophageal varices. - There was moderate portal hypertensive gastropathy with associated heme throughout the stomach. Extensive lavage of the stomach did not reveal a mucosal lesion such as a gastric ulcer. There was no evidence of fresh red blood or active bleeding in the stomach.  - Retroflexion in the stomach did not reveal evidence of gastric varices.  - The gastroscope was able to advance to the duodenal bulb. The gastroscope was unable to advance to the second portion of the duodenum given looping within the stomach.   3/16/2021: Abdominal Ultrasound IMPRESSION: 1. Scalloping the hepatic contour. This is suspicious for cirrhosis. 2. Cholecystectomy. 3. No right hydronephrosis.  3/23/2021: Gastroenterology Follow-Up Appointment  Ms. Dent denies abdominal pain, abdominal swelling, nausea, vomiting, melena, hematochezia, heamtemesis. She has 1-2 bowel movements per day. She is on furosemide and spironolactone. She is on lactulose and rifaximin for hepatic encephaopathy. She is on carvedilol for history of varices.  9/24/2021: Gastroenterology Follow-Up Appointment  Ms. Dent has not acute complaints at today's visit. She denies lower extremity edema. Denies confusion. Denies melena or hematochezia. She is taking furosemide, spironolactone, carvedilol, lactulose, rifaximin. On the lactulose, she has 2-3 bowel movements per day.

## 2021-09-25 LAB
A/G RATIO: 1.6
ALBUMIN: 4.6
ALKALINE PHOSPHATASE: 164
ALT (SGPT): 28
AST (SGOT): 24
BASO (ABSOLUTE): 0.1
BASOS: 1
BILIRUBIN, TOTAL: 0.7
BUN/CREATININE RATIO: 21
BUN: 22
CALCIUM: 10.6
CARBON DIOXIDE, TOTAL: 24
CHLORIDE: 97
CREATININE: 1.03
EGFR IF AFRICN AM: 58
EGFR IF NONAFRICN AM: 50
EOS (ABSOLUTE): 0.1
EOS: 1
GLOBULIN, TOTAL: 2.9
GLUCOSE: 132
HEMATOCRIT: 46.6
HEMATOLOGY COMMENTS:: (no result)
HEMOGLOBIN: 15.7
HEP A AB, IGM: NEGATIVE
HEP A AB, TOTAL: POSITIVE
HEP B CORE AB, TOT: NEGATIVE
IMMATURE CELLS: (no result)
IMMATURE GRANS (ABS): 0
IMMATURE GRANULOCYTES: 0
INR: 1
LYMPHS (ABSOLUTE): 1.8
LYMPHS: 20
MCH: 31.3
MCHC: 33.7
MCV: 93
MONOCYTES(ABSOLUTE): 1.1
MONOCYTES: 12
NEUTROPHILS (ABSOLUTE): 6
NEUTROPHILS: 66
NRBC: (no result)
PLATELETS: 302
POTASSIUM: 5.3
PROTEIN, TOTAL: 7.5
PROTHROMBIN TIME: 10.9
RBC: 5.02
RDW: 13.1
SODIUM: 138
WBC: 9.2

## 2021-10-18 ENCOUNTER — TELEPHONE ENCOUNTER (OUTPATIENT)
Dept: URBAN - NONMETROPOLITAN AREA CLINIC 2 | Facility: CLINIC | Age: 81
End: 2021-10-18

## 2021-10-18 PROBLEM — 15633921000119109: Status: ACTIVE | Noted: 2021-10-18

## 2021-11-01 ENCOUNTER — TELEPHONE ENCOUNTER (OUTPATIENT)
Dept: URBAN - NONMETROPOLITAN AREA CLINIC 2 | Facility: CLINIC | Age: 81
End: 2021-11-01

## 2022-03-14 ENCOUNTER — OFFICE VISIT (OUTPATIENT)
Dept: URBAN - NONMETROPOLITAN AREA CLINIC 2 | Facility: CLINIC | Age: 82
End: 2022-03-14
Payer: MEDICARE

## 2022-03-14 ENCOUNTER — LAB OUTSIDE AN ENCOUNTER (OUTPATIENT)
Dept: URBAN - NONMETROPOLITAN AREA CLINIC 2 | Facility: CLINIC | Age: 82
End: 2022-03-14

## 2022-03-14 ENCOUNTER — WEB ENCOUNTER (OUTPATIENT)
Dept: URBAN - NONMETROPOLITAN AREA CLINIC 2 | Facility: CLINIC | Age: 82
End: 2022-03-14

## 2022-03-14 VITALS
SYSTOLIC BLOOD PRESSURE: 129 MMHG | HEIGHT: 63 IN | DIASTOLIC BLOOD PRESSURE: 81 MMHG | WEIGHT: 128.9 LBS | BODY MASS INDEX: 22.84 KG/M2 | HEART RATE: 66 BPM

## 2022-03-14 DIAGNOSIS — K74.60 CIRRHOSIS: ICD-10-CM

## 2022-03-14 DIAGNOSIS — K76.6 PORTAL HYPERTENSION: ICD-10-CM

## 2022-03-14 DIAGNOSIS — I85.00 ESOPHAGEAL VARICES: ICD-10-CM

## 2022-03-14 DIAGNOSIS — R18.8 ASCITES: ICD-10-CM

## 2022-03-14 DIAGNOSIS — K72.90 HEPATIC ENCEPHALOPATHY: ICD-10-CM

## 2022-03-14 PROCEDURE — 99214 OFFICE O/P EST MOD 30 MIN: CPT | Performed by: INTERNAL MEDICINE

## 2022-03-14 RX ORDER — POTASSIUM CHLORIDE 1.5 G/1
POWDER, FOR SOLUTION ORAL
Qty: 0 | Refills: 0 | Status: ACTIVE | COMMUNITY
Start: 1900-01-01

## 2022-03-14 RX ORDER — RIFAXIMIN 550 MG/1
1 TABLET TABLET ORAL TWICE A DAY
Qty: 60 | Refills: 2 | Status: ACTIVE | COMMUNITY

## 2022-03-14 RX ORDER — ESCITALOPRAM 10 MG/1
TAKE 1 TABLET (10 MG) BY ORAL ROUTE ONCE DAILY TABLET, FILM COATED ORAL 1
Qty: 0 | Refills: 0 | Status: ACTIVE | COMMUNITY
Start: 1900-01-01

## 2022-03-14 RX ORDER — CARVEDILOL 3.12 MG/1
1 TABLET WITH FOOD TABLET, FILM COATED ORAL TWICE A DAY
Qty: 180 TABLET | Refills: 3 | Status: ACTIVE | COMMUNITY
Start: 2021-06-18

## 2022-03-14 RX ORDER — SPIRONOLACTONE 50 MG/1
3 TABLETS TABLET, FILM COATED ORAL ONCE A DAY
Qty: 270 TABLET | Refills: 3 | Status: ACTIVE | COMMUNITY
Start: 2021-06-18 | End: 2022-06-13

## 2022-03-14 RX ORDER — FOLIC ACID 1 MG/1
TAKE 1 TABLET (1 MG) BY ORAL ROUTE ONCE DAILY TABLET ORAL 1
Qty: 0 | Refills: 0 | Status: ACTIVE | COMMUNITY
Start: 1900-01-01

## 2022-03-14 RX ORDER — LEVOTHYROXINE SODIUM 0.1 MG/1
TAKE 1 TABLET (100 MCG) BY ORAL ROUTE ONCE DAILY TABLET ORAL 1
Qty: 0 | Refills: 0 | Status: ACTIVE | COMMUNITY
Start: 1900-01-01

## 2022-03-14 RX ORDER — RISPERIDONE 0.5 MG/1
1 TABLET BID TABLET ORAL
Qty: 0 | Refills: 0 | Status: ACTIVE | COMMUNITY
Start: 1900-01-01

## 2022-03-14 RX ORDER — RIFAXIMIN 550 MG/1
1 TABLET TABLET ORAL TWICE A DAY
Qty: 60 | Refills: 2 | OUTPATIENT

## 2022-03-14 RX ORDER — LACTULOSE 10 G/15ML
30ML PO QD SOLUTION ORAL ONCE A DAY
Qty: 900 ML | Refills: 6 | Status: ACTIVE | COMMUNITY

## 2022-03-14 NOTE — HPI-TODAY'S VISIT:
80 year old female with past medical history of hypertension, hyperlipidemia, CAD, heart failure with preserved ejection fraction, rheumatoid arthritis, B[place] c/b portal hypertension, ascites, HE.          12/2/2020: Follow-up with Dr. Pulido   Ms Dent returns for a f/u visit by telephone. She has cirrhosis from [place] complicated by portal hypertension, ascites and HE. Currently on Lasix 60/Aldactone 150 mg daily and her ascites is well controlled. On Lactulose and Xifaxan and her HE is well controlled.  She is due for an US but this has not been scheduled yet. Recent labs show good synthetic function. AFP is 1.1. Potassium is slightly elevated. She apparently is taking a potassium supplement. EGD last year showed varices and she is on Coreg. No complaints today.  2/22/2021: Follow-Up Gastroenterology Appointment Ms. Dent denies abdominal pain, lower extremity edema, confusion. She denies melena, hematochezia, nausea, vomiting. She is taking lactulose and rifaximin as instructed. She is taking furosemide 60 mg PO daily and spironolactone 100 mg PO daily. She is taking cavedilol 3.125 mg PO BID. She is taking lactulose daily. She had labs obtained prior to today's visit which are pending.   Prior Cirrhosis Workup:  5/20/2019: Hepatitis A IgM negative, alpha-1-antitrypsin normal, iron studies without evidence of iron overload, hepatitis B core IgM negative, hepatitis B surface antigen negative, hepatitis B surface antibody negative, hepatitis C virus antibody negative. Alpha-1-antitrypsin phenotype: MS. 11/21/2020: WBC 8.3, hemoglobin 14.6, hematocrit 41.6, platelets 291. Chemistry panel notable for creatinine 1.20, potassium 5.3. AST normal at 25, ALT normal at 26, alkaline phosphatase elevated at 166, total bilirubin 0.6. AFP 1.1. INR 1.0.  12/2/2020: Hepatitis A IgM negative, hepatitis A total antibody positive. Hepatitis B core antibody negative. INR normal. CBC normal. Chemistry panel notable for elevated calcium 10.4, elevated creatinine 1.16. AST 25, ALT 26, alkaline phosphatase elevated at 175, total bilirubin 0.8.  9/13/2019: EGD Grade II varices were found in the distal esophagus. Moderate portal hypertensive gastropathy was found in the entire examined stomach. Biopsied The examined duodenum was normal. Biopsied. The cardia and gastric fundus were normal on retroflexion. The exam was otherwise without abnormality. 9/13/2019: Pathology from EGD A. Second Part of Duodenum: Normal. B. Gastric, Biopsy: Gastric antral type mucosa with reactive gastropathy.  9/13/2019: Colonoscopy  The perianal and digital rectal examinations were normal. A few small-mouthed diverticula were found in the sigmoid and descending colon. The exam was otherwise without abnormality.  3/2/2021: EGD FINDINGS / IMPRESSION: - The esophagus showed decreased motility throughout the esophagus. - There was one column of grade I varices at the distal esophagus. There was no evidence of high risk bleeding stigmata on the esophageal varices. - There was moderate portal hypertensive gastropathy with associated heme throughout the stomach. Extensive lavage of the stomach did not reveal a mucosal lesion such as a gastric ulcer. There was no evidence of fresh red blood or active bleeding in the stomach.  - Retroflexion in the stomach did not reveal evidence of gastric varices.  - The gastroscope was able to advance to the duodenal bulb. The gastroscope was unable to advance to the second portion of the duodenum given looping within the stomach.   3/16/2021: Abdominal Ultrasound IMPRESSION: 1. Scalloping the hepatic contour. This is suspicious for cirrhosis. 2. Cholecystectomy. 3. No right hydronephrosis.  3/23/2021: Gastroenterology Follow-Up Appointment  Ms. Dent denies abdominal pain, abdominal swelling, nausea, vomiting, melena, hematochezia, heamtemesis. She has 1-2 bowel movements per day. She is on furosemide and spironolactone. She is on lactulose and rifaximin for hepatic encephaopathy. She is on carvedilol for history of varices.  9/24/2021: Gastroenterology Follow-Up Appointment  Ms. Dent has not acute complaints at today's visit. She denies lower extremity edema. Denies confusion. Denies melena or hematochezia. She is taking furosemide, spironolactone, carvedilol, lactulose, rifaximin. On the lactulose, she has 2-3 bowel movements per day.  9/2021: CBC normal. Chemistry panel with elevated potassium of 5.3, calcium 10.6. LFTs normal except for slightly elevated alkaline phosphatase of 164. INR normal. 10/13/2021: Abdominal Ultrasound  IMPRESSION: 1. Mild nodular surface contour of liver which may reflect early/mild cirrhosis. 2. Echogenic left kidney suggestive of medical renal disease.   3/14/2022: Gastroenterology Follow-Up VIsit  Denies melena, hematochezia. Continues to take furosemide and spironolactone. Continues lactulose and rifaximin. Has 2-3 bowel movements per day on lactulose. Denies lower extremity edema, abdominal swelling, or confusion. She is planning on following up with Nephrology this month.

## 2022-03-14 NOTE — PHYSICAL EXAM CONSTITUTIONAL:
well developed, well nourished , in no acute distress , ambulating with a walker , normal communication ability

## 2022-05-10 ENCOUNTER — TELEPHONE ENCOUNTER (OUTPATIENT)
Dept: URBAN - NONMETROPOLITAN AREA CLINIC 2 | Facility: CLINIC | Age: 82
End: 2022-05-10

## 2022-05-10 RX ORDER — LACTULOSE 10 G/15ML
15 ML SOLUTION ORAL ONCE A DAY
Qty: 450 ML | Refills: 6

## 2022-07-19 ENCOUNTER — TELEPHONE ENCOUNTER (OUTPATIENT)
Dept: URBAN - NONMETROPOLITAN AREA CLINIC 2 | Facility: CLINIC | Age: 82
End: 2022-07-19

## 2022-07-19 RX ORDER — RIFAXIMIN 550 MG/1
1 TABLET TABLET ORAL TWICE A DAY
Qty: 180 | Refills: 3

## 2022-09-12 ENCOUNTER — LAB OUTSIDE AN ENCOUNTER (OUTPATIENT)
Dept: URBAN - NONMETROPOLITAN AREA CLINIC 2 | Facility: CLINIC | Age: 82
End: 2022-09-12

## 2022-09-12 ENCOUNTER — OFFICE VISIT (OUTPATIENT)
Dept: URBAN - NONMETROPOLITAN AREA CLINIC 2 | Facility: CLINIC | Age: 82
End: 2022-09-12
Payer: MEDICARE

## 2022-09-12 VITALS
HEIGHT: 63 IN | TEMPERATURE: 98.5 F | HEART RATE: 70 BPM | SYSTOLIC BLOOD PRESSURE: 126 MMHG | WEIGHT: 132 LBS | DIASTOLIC BLOOD PRESSURE: 60 MMHG | BODY MASS INDEX: 23.39 KG/M2

## 2022-09-12 DIAGNOSIS — R18.8 ASCITES: ICD-10-CM

## 2022-09-12 DIAGNOSIS — I85.00 ESOPHAGEAL VARICES: ICD-10-CM

## 2022-09-12 DIAGNOSIS — K76.6 PORTAL HYPERTENSION: ICD-10-CM

## 2022-09-12 DIAGNOSIS — K72.90 HEPATIC ENCEPHALOPATHY: ICD-10-CM

## 2022-09-12 DIAGNOSIS — K74.60 CIRRHOSIS: ICD-10-CM

## 2022-09-12 PROCEDURE — 99214 OFFICE O/P EST MOD 30 MIN: CPT | Performed by: INTERNAL MEDICINE

## 2022-09-12 RX ORDER — SPIRONOLACTONE 50 MG/1
1 TABLET TABLET, FILM COATED ORAL ONCE A DAY
Status: ACTIVE | COMMUNITY

## 2022-09-12 RX ORDER — CARVEDILOL 3.12 MG/1
1 TABLET WITH FOOD TABLET, FILM COATED ORAL TWICE A DAY
Qty: 180 TABLET | Refills: 3 | Status: ACTIVE | COMMUNITY
Start: 2021-06-18

## 2022-09-12 RX ORDER — FOLIC ACID 1 MG/1
TAKE 1 TABLET (1 MG) BY ORAL ROUTE ONCE DAILY TABLET ORAL 1
Qty: 0 | Refills: 0 | Status: ACTIVE | COMMUNITY
Start: 1900-01-01

## 2022-09-12 RX ORDER — RIFAXIMIN 550 MG/1
1 TABLET TABLET ORAL TWICE A DAY
Qty: 60 | Refills: 2 | OUTPATIENT

## 2022-09-12 RX ORDER — LEVOTHYROXINE SODIUM 0.1 MG/1
TAKE 1 TABLET (100 MCG) BY ORAL ROUTE ONCE DAILY TABLET ORAL 1
Qty: 0 | Refills: 0 | Status: ACTIVE | COMMUNITY
Start: 1900-01-01

## 2022-09-12 RX ORDER — ESCITALOPRAM 10 MG/1
TAKE 1 TABLET (10 MG) BY ORAL ROUTE ONCE DAILY TABLET, FILM COATED ORAL 1
Qty: 0 | Refills: 0 | Status: ACTIVE | COMMUNITY
Start: 1900-01-01

## 2022-09-12 NOTE — PHYSICAL EXAM PSYCH:
normal mood with appropriate affect no anemia, no easy bruising, no jaundice, no swollen lymph nodes.

## 2022-09-12 NOTE — HPI-TODAY'S VISIT:
82 year old female with past medical history of hypertension, hyperlipidemia, CAD, heart failure with preserved ejection fraction, rheumatoid arthritis, B[place] c/b portal hypertension, ascites, HE.            12/2/2020: Follow-up with Dr. Pulido   Ms Dent returns for a f/u visit by telephone. She has cirrhosis from [place] complicated by portal hypertension, ascites and HE. Currently on Lasix 60/Aldactone 150 mg daily and her ascites is well controlled. On Lactulose and Xifaxan and her HE is well controlled.  She is due for an US but this has not been scheduled yet. Recent labs show good synthetic function. AFP is 1.1. Potassium is slightly elevated. She apparently is taking a potassium supplement. EGD last year showed varices and she is on Coreg. No complaints today.  2/22/2021: Follow-Up Gastroenterology Appointment Ms. Dent denies abdominal pain, lower extremity edema, confusion. She denies melena, hematochezia, nausea, vomiting. She is taking lactulose and rifaximin as instructed. She is taking furosemide 60 mg PO daily and spironolactone 100 mg PO daily. She is taking cavedilol 3.125 mg PO BID. She had labs obtained prior to today's visit which are pending.   Prior Cirrhosis Workup:  5/20/2019: Hepatitis A IgM negative, alpha-1-antitrypsin normal, iron studies without evidence of iron overload, hepatitis B core IgM negative, hepatitis B surface antigen negative, hepatitis B surface antibody negative, hepatitis C virus antibody negative. Alpha-1-antitrypsin phenotype: MS. 11/21/2020: WBC 8.3, hemoglobin 14.6, hematocrit 41.6, platelets 291. Chemistry panel notable for creatinine 1.20, potassium 5.3. AST normal at 25, ALT normal at 26, alkaline phosphatase elevated at 166, total bilirubin 0.6. AFP 1.1. INR 1.0.  12/2/2020: Hepatitis A IgM negative, hepatitis A total antibody positive. Hepatitis B core antibody negative. INR normal. CBC normal. Chemistry panel notable for elevated calcium 10.4, elevated creatinine 1.16. AST 25, ALT 26, alkaline phosphatase elevated at 175, total bilirubin 0.8.  9/13/2019: EGD Grade II varices were found in the distal esophagus. Moderate portal hypertensive gastropathy was found in the entire examined stomach. Biopsied The examined duodenum was normal. Biopsied. The cardia and gastric fundus were normal on retroflexion. The exam was otherwise without abnormality. 9/13/2019: Pathology from EGD A. Second Part of Duodenum: Normal. B. Gastric, Biopsy: Gastric antral type mucosa with reactive gastropathy.  9/13/2019: Colonoscopy  The perianal and digital rectal examinations were normal. A few small-mouthed diverticula were found in the sigmoid and descending colon. The exam was otherwise without abnormality.  3/2/2021: EGD FINDINGS / IMPRESSION: - The esophagus showed decreased motility throughout the esophagus. - There was one column of grade I varices at the distal esophagus. There was no evidence of high risk bleeding stigmata on the esophageal varices. - There was moderate portal hypertensive gastropathy with associated heme throughout the stomach. Extensive lavage of the stomach did not reveal a mucosal lesion such as a gastric ulcer. There was no evidence of fresh red blood or active bleeding in the stomach.  - Retroflexion in the stomach did not reveal evidence of gastric varices.  - The gastroscope was able to advance to the duodenal bulb. The gastroscope was unable to advance to the second portion of the duodenum given looping within the stomach.   3/16/2021: Abdominal Ultrasound IMPRESSION: 1. Scalloping the hepatic contour. This is suspicious for cirrhosis. 2. Cholecystectomy. 3. No right hydronephrosis.  3/23/2021: Gastroenterology Follow-Up Appointment  Ms. Dent denies abdominal pain, abdominal swelling, nausea, vomiting, melena, hematochezia, heamtemesis. She has 1-2 bowel movements per day. She is on furosemide and spironolactone. She is on lactulose and rifaximin for hepatic encephaopathy. She is on carvedilol for history of varices.  9/24/2021: Gastroenterology Follow-Up Appointment  Ms. Dent has no acute complaints at today's visit. She denies lower extremity edema. Denies confusion. Denies melena or hematochezia. She is taking furosemide, spironolactone, carvedilol, lactulose, rifaximin. On the lactulose, she has 2-3 bowel movements per day.  9/2021: CBC normal. Chemistry panel with elevated potassium of 5.3, calcium 10.6. LFTs normal except for slightly elevated alkaline phosphatase of 164. INR normal. 10/13/2021: Abdominal Ultrasound  IMPRESSION: 1. Mild nodular surface contour of liver which may reflect early/mild cirrhosis. 2. Echogenic left kidney suggestive of medical renal disease.   3/14/2022: Gastroenterology Follow-Up VIsit  Denies melena, hematochezia. Continues to take furosemide and spironolactone. Continues lactulose and rifaximin. Has 2-3 bowel movements per day on lactulose. Denies lower extremity edema, abdominal swelling, or confusion. She is planning on following up with Nephrology this month.  3/30/2022: Right Upper Quadrant Ultrasound IMPRESSION: 1. No right hydronephrosis. 2. Cholecystectomy.  9/12/2022: Gastroenterology Follow-Up Visit Denies abdominal pain, melena, hematochezia, lower extremity edema, abdominal swellling, confusion. Having 1-2 bowel movements per day on lactulose. Continues to take rifaximin. Continuing on fuorsemide + spironolactone with medication doses monitored by Nephrology team. Currently on carvedilol given history of esophageal varices.

## 2022-09-13 LAB
A/G RATIO: 1.6
ABSOLUTE BASOPHILS: 33
ABSOLUTE EOSINOPHILS: 230
ABSOLUTE LYMPHOCYTES: 1501
ABSOLUTE MONOCYTES: 902
ABSOLUTE NEUTROPHILS: 5535
AFP, SERUM, TUMOR MARKER: 1.7
ALBUMIN: 3.9
ALKALINE PHOSPHATASE: 115
ALT (SGPT): 20
AST (SGOT): 20
BASOPHILS: 0.4
BILIRUBIN, TOTAL: 0.5
BUN/CREATININE RATIO: 15
BUN: 15
CALCIUM: 10
CARBON DIOXIDE, TOTAL: 24
CHLORIDE: 107
CREATININE: 1
EGFR: 56
EOSINOPHILS: 2.8
GLOBULIN, TOTAL: 2.5
GLUCOSE: 79
HEMATOCRIT: 36.5
HEMOGLOBIN: 12.2
INR: 1
LYMPHOCYTES: 18.3
MCH: 30.8
MCHC: 33.4
MCV: 92.2
MONOCYTES: 11
MPV: 9.8
NEUTROPHILS: 67.5
PLATELET COUNT: 323
POTASSIUM: 4.8
PROTEIN, TOTAL: 6.4
PT: 10.5
RDW: 12.6
RED BLOOD CELL COUNT: 3.96
SODIUM: 140
WHITE BLOOD CELL COUNT: 8.2

## 2022-09-14 ENCOUNTER — OFFICE VISIT (OUTPATIENT)
Dept: URBAN - NONMETROPOLITAN AREA CLINIC 13 | Facility: CLINIC | Age: 82
End: 2022-09-14

## 2022-09-19 PROBLEM — 389026000 ASCITES: Status: ACTIVE | Noted: 2020-12-02

## 2022-09-19 PROBLEM — 34742003 PORTAL HYPERTENSION: Status: ACTIVE | Noted: 2020-08-21

## 2022-09-19 PROBLEM — 28670008 ESOPHAGEAL VARICES: Status: ACTIVE | Noted: 2020-12-02

## 2022-12-14 ENCOUNTER — TELEPHONE ENCOUNTER (OUTPATIENT)
Dept: URBAN - NONMETROPOLITAN AREA CLINIC 2 | Facility: CLINIC | Age: 82
End: 2022-12-14

## 2022-12-14 RX ORDER — LACTULOSE 10 G/15ML
15 ML SOLUTION ORAL DAILY
Qty: 1350 ML | Refills: 3

## 2022-12-16 PROBLEM — 255417007 CIRRHOTIC: Status: ACTIVE | Noted: 2020-12-02

## 2023-03-01 ENCOUNTER — TELEPHONE ENCOUNTER (OUTPATIENT)
Dept: URBAN - NONMETROPOLITAN AREA CLINIC 2 | Facility: CLINIC | Age: 83
End: 2023-03-01

## 2023-03-01 PROBLEM — 13920009 HEPATIC ENCEPHALOPATHY: Status: ACTIVE | Noted: 2021-02-22

## 2023-03-01 RX ORDER — RIFAXIMIN 550 MG/1
1 TABLET TABLET ORAL TWICE A DAY
Qty: 60 | Refills: 2
End: 2023-05-30

## 2023-03-14 ENCOUNTER — OFFICE VISIT (OUTPATIENT)
Dept: URBAN - NONMETROPOLITAN AREA CLINIC 2 | Facility: CLINIC | Age: 83
End: 2023-03-14

## 2023-05-03 ENCOUNTER — LAB OUTSIDE AN ENCOUNTER (OUTPATIENT)
Dept: URBAN - NONMETROPOLITAN AREA CLINIC 2 | Facility: CLINIC | Age: 83
End: 2023-05-03

## 2023-05-03 ENCOUNTER — OFFICE VISIT (OUTPATIENT)
Dept: URBAN - NONMETROPOLITAN AREA CLINIC 2 | Facility: CLINIC | Age: 83
End: 2023-05-03
Payer: MEDICARE

## 2023-05-03 ENCOUNTER — DASHBOARD ENCOUNTERS (OUTPATIENT)
Age: 83
End: 2023-05-03

## 2023-05-03 VITALS
HEIGHT: 63 IN | BODY MASS INDEX: 24.45 KG/M2 | WEIGHT: 138 LBS | HEART RATE: 43 BPM | TEMPERATURE: 98.2 F | SYSTOLIC BLOOD PRESSURE: 154 MMHG | DIASTOLIC BLOOD PRESSURE: 70 MMHG

## 2023-05-03 DIAGNOSIS — R10.13 EPIGASTRIC PAIN: ICD-10-CM

## 2023-05-03 DIAGNOSIS — K76.82 HEPATIC ENCEPHALOPATHY: ICD-10-CM

## 2023-05-03 DIAGNOSIS — R19.8 ABNORMAL ABDOMINAL EXAM: ICD-10-CM

## 2023-05-03 DIAGNOSIS — K74.69 OTHER CIRRHOSIS OF LIVER: ICD-10-CM

## 2023-05-03 PROBLEM — 19943007: Status: ACTIVE | Noted: 2023-05-03

## 2023-05-03 PROCEDURE — 99214 OFFICE O/P EST MOD 30 MIN: CPT | Performed by: NURSE PRACTITIONER

## 2023-05-03 RX ORDER — RIFAXIMIN 550 MG/1
1 TABLET TABLET ORAL TWICE A DAY
Qty: 60 | Refills: 2 | Status: ACTIVE | COMMUNITY
End: 2023-05-30

## 2023-05-03 RX ORDER — SPIRONOLACTONE 50 MG/1
1 TABLET TABLET, FILM COATED ORAL ONCE A DAY
Status: ACTIVE | COMMUNITY

## 2023-05-03 RX ORDER — RIFAXIMIN 550 MG/1
1 TABLET TABLET ORAL TWICE A DAY
Qty: 60 | Refills: 11 | OUTPATIENT
Start: 2023-05-03 | End: 2024-04-27

## 2023-05-03 RX ORDER — ESCITALOPRAM 10 MG/1
TAKE 1 TABLET (10 MG) BY ORAL ROUTE ONCE DAILY TABLET, FILM COATED ORAL 1
Qty: 0 | Refills: 0 | Status: ACTIVE | COMMUNITY
Start: 1900-01-01

## 2023-05-03 RX ORDER — LEVOTHYROXINE SODIUM 0.1 MG/1
TAKE 1 TABLET (100 MCG) BY ORAL ROUTE ONCE DAILY TABLET ORAL 1
Qty: 0 | Refills: 0 | Status: ACTIVE | COMMUNITY
Start: 1900-01-01

## 2023-05-03 RX ORDER — CARVEDILOL 3.12 MG/1
1 TABLET WITH FOOD TABLET, FILM COATED ORAL TWICE A DAY
Qty: 180 TABLET | Refills: 3 | Status: ACTIVE | COMMUNITY
Start: 2021-06-18

## 2023-05-03 RX ORDER — PANTOPRAZOLE SODIUM 40 MG/1
1 TABLET TABLET, DELAYED RELEASE ORAL ONCE A DAY
Qty: 30 | Refills: 11 | OUTPATIENT
Start: 2023-05-03

## 2023-05-03 RX ORDER — FOLIC ACID 1 MG/1
TAKE 1 TABLET (1 MG) BY ORAL ROUTE ONCE DAILY TABLET ORAL 1
Qty: 0 | Refills: 0 | Status: ACTIVE | COMMUNITY
Start: 1900-01-01

## 2023-05-03 RX ORDER — LACTULOSE 10 G/15ML
15 ML SOLUTION ORAL DAILY
Qty: 1350 ML | Refills: 3 | Status: ACTIVE | COMMUNITY

## 2023-05-04 ENCOUNTER — TELEPHONE ENCOUNTER (OUTPATIENT)
Dept: URBAN - METROPOLITAN AREA CLINIC 35 | Facility: CLINIC | Age: 83
End: 2023-05-04

## 2023-05-04 LAB
A/G RATIO: 1.7
ABSOLUTE BASOPHILS: 33
ABSOLUTE EOSINOPHILS: 241
ABSOLUTE LYMPHOCYTES: 1336
ABSOLUTE MONOCYTES: 813
ABSOLUTE NEUTROPHILS: 5876
ALBUMIN: 4.3
ALKALINE PHOSPHATASE: 105
ALT (SGPT): 22
AST (SGOT): 22
BASOPHILS: 0.4
BILIRUBIN, TOTAL: 0.6
BUN/CREATININE RATIO: 17
BUN: 25
CALCIUM: 9.9
CARBON DIOXIDE, TOTAL: 26
CHLORIDE: 105
CREATININE: 1.44
EGFR: 36
EOSINOPHILS: 2.9
GLOBULIN, TOTAL: 2.6
GLUCOSE: 65
HEMATOCRIT: 37.2
HEMOGLOBIN: 12.8
INR: 1
LYMPHOCYTES: 16.1
MCH: 32.2
MCHC: 34.4
MCV: 93.5
MONOCYTES: 9.8
MPV: 10.2
NEUTROPHILS: 70.8
PLATELET COUNT: 278
POTASSIUM: 5.6
PROTEIN, TOTAL: 6.9
PT: 10.8
RDW: 12.8
RED BLOOD CELL COUNT: 3.98
SODIUM: 140
WHITE BLOOD CELL COUNT: 8.3

## 2023-07-10 ENCOUNTER — OFFICE VISIT (OUTPATIENT)
Dept: URBAN - METROPOLITAN AREA MEDICAL CENTER 1 | Facility: MEDICAL CENTER | Age: 83
End: 2023-07-10
Payer: MEDICARE

## 2023-07-10 DIAGNOSIS — I85.10 ESOPH VARICE OTHER DIS: ICD-10-CM

## 2023-07-10 DIAGNOSIS — K74.60 ADVANCED CIRRHOSIS: ICD-10-CM

## 2023-07-10 PROCEDURE — 43235 EGD DIAGNOSTIC BRUSH WASH: CPT | Performed by: INTERNAL MEDICINE

## 2023-07-10 RX ORDER — PANTOPRAZOLE SODIUM 40 MG/1
1 TABLET TABLET, DELAYED RELEASE ORAL ONCE A DAY
Qty: 30 | Refills: 11 | Status: ACTIVE | COMMUNITY
Start: 2023-05-03

## 2023-07-10 RX ORDER — ESCITALOPRAM 10 MG/1
TAKE 1 TABLET (10 MG) BY ORAL ROUTE ONCE DAILY TABLET, FILM COATED ORAL 1
Qty: 0 | Refills: 0 | Status: ACTIVE | COMMUNITY
Start: 1900-01-01

## 2023-07-10 RX ORDER — RIFAXIMIN 550 MG/1
1 TABLET TABLET ORAL TWICE A DAY
Qty: 60 | Refills: 11 | Status: ACTIVE | COMMUNITY
Start: 2023-05-03 | End: 2024-04-27

## 2023-07-10 RX ORDER — LEVOTHYROXINE SODIUM 0.1 MG/1
TAKE 1 TABLET (100 MCG) BY ORAL ROUTE ONCE DAILY TABLET ORAL 1
Qty: 0 | Refills: 0 | Status: ACTIVE | COMMUNITY
Start: 1900-01-01

## 2023-07-10 RX ORDER — SPIRONOLACTONE 50 MG/1
1 TABLET TABLET, FILM COATED ORAL ONCE A DAY
Status: ACTIVE | COMMUNITY

## 2023-07-10 RX ORDER — LACTULOSE 10 G/15ML
15 ML SOLUTION ORAL DAILY
Qty: 1350 ML | Refills: 3 | Status: ACTIVE | COMMUNITY

## 2023-07-10 RX ORDER — CARVEDILOL 3.12 MG/1
1 TABLET WITH FOOD TABLET, FILM COATED ORAL TWICE A DAY
Qty: 180 TABLET | Refills: 3 | Status: ACTIVE | COMMUNITY
Start: 2021-06-18

## 2023-07-10 RX ORDER — FOLIC ACID 1 MG/1
TAKE 1 TABLET (1 MG) BY ORAL ROUTE ONCE DAILY TABLET ORAL 1
Qty: 0 | Refills: 0 | Status: ACTIVE | COMMUNITY
Start: 1900-01-01

## 2023-09-26 ENCOUNTER — OFFICE VISIT (OUTPATIENT)
Dept: URBAN - NONMETROPOLITAN AREA CLINIC 2 | Facility: CLINIC | Age: 83
End: 2023-09-26

## 2023-11-20 ENCOUNTER — TELEPHONE ENCOUNTER (OUTPATIENT)
Dept: URBAN - NONMETROPOLITAN AREA CLINIC 2 | Facility: CLINIC | Age: 83
End: 2023-11-20

## 2023-11-20 RX ORDER — LACTULOSE 10 G/15ML
15 ML SOLUTION ORAL DAILY
Qty: 1350 ML | Refills: 0
End: 2024-02-18

## 2024-05-03 ENCOUNTER — ERX REFILL RESPONSE (OUTPATIENT)
Dept: URBAN - NONMETROPOLITAN AREA CLINIC 2 | Facility: CLINIC | Age: 84
End: 2024-05-03

## 2024-05-03 RX ORDER — RIFAXIMIN 550 MG/1
TAKE 1 TABLET BY MOUTH TWICE DAILY TABLET ORAL
Qty: 60 TABLET | Refills: 0 | OUTPATIENT

## 2024-05-07 ENCOUNTER — TELEPHONE ENCOUNTER (OUTPATIENT)
Dept: URBAN - NONMETROPOLITAN AREA CLINIC 2 | Facility: CLINIC | Age: 84
End: 2024-05-07

## 2024-05-07 RX ORDER — RIFAXIMIN 550 MG/1
TAKE 1 TABLET BY MOUTH TWICE DAILY TABLET ORAL
Qty: 60 TABLET | Refills: 0

## 2024-05-07 RX ORDER — LACTULOSE 10 G/15ML
15 ML SOLUTION ORAL DAILY
Qty: 1350 ML | Refills: 0
End: 2024-08-05

## 2024-06-10 ENCOUNTER — ERX REFILL RESPONSE (OUTPATIENT)
Dept: URBAN - NONMETROPOLITAN AREA CLINIC 2 | Facility: CLINIC | Age: 84
End: 2024-06-10

## 2024-06-10 RX ORDER — RIFAXIMIN 550 MG/1
TAKE 1 TABLET BY MOUTH TWICE DAILY TABLET ORAL
Qty: 60 TABLET | Refills: 0 | OUTPATIENT

## 2024-06-12 ENCOUNTER — TELEPHONE ENCOUNTER (OUTPATIENT)
Dept: URBAN - NONMETROPOLITAN AREA CLINIC 2 | Facility: CLINIC | Age: 84
End: 2024-06-12

## 2024-06-12 RX ORDER — PANTOPRAZOLE SODIUM 40 MG/1
1 TABLET TABLET, DELAYED RELEASE ORAL ONCE A DAY
Qty: 30 | Refills: 11
Start: 2023-05-03

## 2024-06-13 ENCOUNTER — TELEPHONE ENCOUNTER (OUTPATIENT)
Dept: URBAN - NONMETROPOLITAN AREA CLINIC 2 | Facility: CLINIC | Age: 84
End: 2024-06-13

## 2024-07-09 ENCOUNTER — ERX REFILL RESPONSE (OUTPATIENT)
Dept: URBAN - NONMETROPOLITAN AREA CLINIC 2 | Facility: CLINIC | Age: 84
End: 2024-07-09

## 2024-07-09 RX ORDER — RIFAXIMIN 550 MG/1
TAKE 1 TABLET BY MOUTH TWICE DAILY TABLET ORAL
Qty: 60 TABLET | Refills: 0 | OUTPATIENT

## 2024-07-11 ENCOUNTER — TELEPHONE ENCOUNTER (OUTPATIENT)
Dept: URBAN - NONMETROPOLITAN AREA CLINIC 2 | Facility: CLINIC | Age: 84
End: 2024-07-11

## 2024-08-08 ENCOUNTER — TELEPHONE ENCOUNTER (OUTPATIENT)
Dept: URBAN - NONMETROPOLITAN AREA CLINIC 2 | Facility: CLINIC | Age: 84
End: 2024-08-08

## 2024-08-08 RX ORDER — LACTULOSE 10 G/15ML
15 ML SOLUTION ORAL DAILY
Qty: 1350 ML | Refills: 0
End: 2024-11-06

## 2024-08-08 RX ORDER — RIFAXIMIN 550 MG/1
TAKE 1 TABLET BY MOUTH TWICE DAILY TABLET ORAL
Qty: 60 TABLET | Refills: 0

## 2024-08-13 ENCOUNTER — OFFICE VISIT (OUTPATIENT)
Dept: URBAN - NONMETROPOLITAN AREA CLINIC 2 | Facility: CLINIC | Age: 84
End: 2024-08-13
Payer: MEDICARE

## 2024-08-13 ENCOUNTER — LAB OUTSIDE AN ENCOUNTER (OUTPATIENT)
Dept: URBAN - NONMETROPOLITAN AREA CLINIC 2 | Facility: CLINIC | Age: 84
End: 2024-08-13

## 2024-08-13 VITALS
BODY MASS INDEX: 23.92 KG/M2 | HEIGHT: 63 IN | SYSTOLIC BLOOD PRESSURE: 75 MMHG | WEIGHT: 135 LBS | DIASTOLIC BLOOD PRESSURE: 62 MMHG | TEMPERATURE: 98 F

## 2024-08-13 DIAGNOSIS — K74.69 OTHER CIRRHOSIS OF LIVER: ICD-10-CM

## 2024-08-13 DIAGNOSIS — K76.82 HEPATIC ENCEPHALOPATHY: ICD-10-CM

## 2024-08-13 PROCEDURE — 99214 OFFICE O/P EST MOD 30 MIN: CPT | Performed by: NURSE PRACTITIONER

## 2024-08-13 RX ORDER — SPIRONOLACTONE 50 MG/1
1 TABLET TABLET, FILM COATED ORAL ONCE A DAY
Status: ACTIVE | COMMUNITY

## 2024-08-13 RX ORDER — FOLIC ACID 1 MG/1
TAKE 1 TABLET (1 MG) BY ORAL ROUTE ONCE DAILY TABLET ORAL 1
Qty: 0 | Refills: 0 | Status: ACTIVE | COMMUNITY
Start: 1900-01-01

## 2024-08-13 RX ORDER — LACTULOSE 10 G/15ML
15 ML SOLUTION ORAL DAILY
Qty: 1350 ML | Refills: 0 | Status: ACTIVE | COMMUNITY
End: 2024-11-06

## 2024-08-13 RX ORDER — CARVEDILOL 3.12 MG/1
1 TABLET WITH FOOD TABLET, FILM COATED ORAL TWICE A DAY
Qty: 180 TABLET | Refills: 3 | Status: ACTIVE | COMMUNITY
Start: 2021-06-18

## 2024-08-13 RX ORDER — ESCITALOPRAM 10 MG/1
TAKE 1 TABLET (10 MG) BY ORAL ROUTE ONCE DAILY TABLET, FILM COATED ORAL 1
Qty: 0 | Refills: 0 | Status: ACTIVE | COMMUNITY
Start: 1900-01-01

## 2024-08-13 RX ORDER — LACTULOSE 10 G/15ML
15 ML SOLUTION ORAL DAILY
Qty: 1350 ML | Refills: 3

## 2024-08-13 RX ORDER — LEVOTHYROXINE SODIUM 0.1 MG/1
TAKE 1 TABLET (100 MCG) BY ORAL ROUTE ONCE DAILY TABLET ORAL 1
Qty: 0 | Refills: 0 | Status: ACTIVE | COMMUNITY
Start: 1900-01-01

## 2024-08-13 RX ORDER — RIFAXIMIN 550 MG/1
TAKE 1 TABLET BY MOUTH TWICE DAILY TABLET ORAL
Qty: 60 TABLET | Refills: 0 | Status: ACTIVE | COMMUNITY

## 2024-08-13 RX ORDER — PANTOPRAZOLE SODIUM 40 MG/1
1 TABLET TABLET, DELAYED RELEASE ORAL ONCE A DAY
Qty: 30 | Refills: 11 | Status: ACTIVE | COMMUNITY
Start: 2023-05-03

## 2024-08-13 RX ORDER — RIFAXIMIN 550 MG/1
TAKE 1 TABLET BY MOUTH TWICE DAILY TABLET ORAL TWICE A DAY
Qty: 180 TABLET | Refills: 3

## 2024-08-13 NOTE — HPI-TODAY'S VISIT:
Phuong is an 84-year-old female with a past medical history of nonalcoholic fatty liver disease who presents for routine follow-up Of cirrhosis.  She has CHF as well.  She does have a history of HE and takes rifaximin for this. moves bowels 2-3x daily . No additional complaints.  She does not have any notable ascites today, but has had that in the past. Sb 2023 EGD with large HH, small varices no stigmata

## 2024-08-14 ENCOUNTER — TELEPHONE ENCOUNTER (OUTPATIENT)
Dept: URBAN - NONMETROPOLITAN AREA CLINIC 2 | Facility: CLINIC | Age: 84
End: 2024-08-14

## 2024-08-14 LAB
A/G RATIO: 1.8
ABSOLUTE BASOPHILS: 41
ABSOLUTE EOSINOPHILS: 177
ABSOLUTE LYMPHOCYTES: 1353
ABSOLUTE MONOCYTES: 796
ABSOLUTE NEUTROPHILS: 4434
ALBUMIN: 4.3
ALKALINE PHOSPHATASE: 85
ALT (SGPT): 13
AST (SGOT): 18
BASOPHILS: 0.6
BILIRUBIN, TOTAL: 0.7
BUN/CREATININE RATIO: 18
BUN: 25
CALCIUM: 10.6
CARBON DIOXIDE, TOTAL: 24
CHLORIDE: 104
CREATININE: 1.37
EGFR: 38
EOSINOPHILS: 2.6
GLOBULIN, TOTAL: 2.4
GLUCOSE: 89
HEMATOCRIT: 41.7
HEMOGLOBIN: 13.9
INR: 1.1
LYMPHOCYTES: 19.9
MCH: 32.1
MCHC: 33.3
MCV: 96.3
MONOCYTES: 11.7
MPV: 10.1
NEUTROPHILS: 65.2
PLATELET COUNT: 285
POTASSIUM: 5.4
PROTEIN, TOTAL: 6.7
PT: 11.3
RDW: 12.8
RED BLOOD CELL COUNT: 4.33
SODIUM: 138
WHITE BLOOD CELL COUNT: 6.8

## 2025-05-07 ENCOUNTER — TELEPHONE ENCOUNTER (OUTPATIENT)
Dept: URBAN - NONMETROPOLITAN AREA CLINIC 2 | Facility: CLINIC | Age: 85
End: 2025-05-07

## 2025-05-07 RX ORDER — PANTOPRAZOLE SODIUM 40 MG/1
1 TABLET TABLET, DELAYED RELEASE ORAL ONCE A DAY
Qty: 30 | Refills: 11
Start: 2023-05-03

## 2025-06-02 ENCOUNTER — TELEPHONE ENCOUNTER (OUTPATIENT)
Dept: URBAN - NONMETROPOLITAN AREA CLINIC 2 | Facility: CLINIC | Age: 85
End: 2025-06-02

## 2025-06-02 RX ORDER — PANTOPRAZOLE SODIUM 40 MG/1
1 TABLET TABLET, DELAYED RELEASE ORAL ONCE A DAY
Qty: 30 | Refills: 11
Start: 2023-05-03

## 2025-08-05 ENCOUNTER — TELEPHONE ENCOUNTER (OUTPATIENT)
Dept: URBAN - NONMETROPOLITAN AREA CLINIC 2 | Facility: CLINIC | Age: 85
End: 2025-08-05

## 2025-08-05 RX ORDER — LACTULOSE 10 G/15ML
15 ML SOLUTION ORAL DAILY
Qty: 1350 ML | Refills: 3

## 2025-08-07 ENCOUNTER — ERX REFILL RESPONSE (OUTPATIENT)
Dept: URBAN - NONMETROPOLITAN AREA CLINIC 2 | Facility: CLINIC | Age: 85
End: 2025-08-07

## 2025-08-07 RX ORDER — LACTULOSE 10 G/15ML
TAKE 3 TEASPOONFULS (15ML) BY MOUTH EVERY DAY SOLUTION ORAL
Qty: 1350 MILLILITER | Refills: 3

## 2025-08-11 ENCOUNTER — OFFICE VISIT (OUTPATIENT)
Dept: URBAN - NONMETROPOLITAN AREA CLINIC 2 | Facility: CLINIC | Age: 85
End: 2025-08-11

## 2025-08-20 ENCOUNTER — TELEPHONE ENCOUNTER (OUTPATIENT)
Dept: URBAN - NONMETROPOLITAN AREA CLINIC 2 | Facility: CLINIC | Age: 85
End: 2025-08-20

## 2025-08-20 RX ORDER — RIFAXIMIN 550 MG/1
TAKE 1 TABLET BY MOUTH TWICE DAILY TABLET ORAL TWICE A DAY
Qty: 180 TABLET | Refills: 3